# Patient Record
Sex: MALE | Race: WHITE | NOT HISPANIC OR LATINO | Employment: UNEMPLOYED | URBAN - METROPOLITAN AREA
[De-identification: names, ages, dates, MRNs, and addresses within clinical notes are randomized per-mention and may not be internally consistent; named-entity substitution may affect disease eponyms.]

---

## 2023-06-02 ENCOUNTER — HOSPITAL ENCOUNTER (EMERGENCY)
Facility: HOSPITAL | Age: 38
Discharge: HOME | End: 2023-06-02
Attending: EMERGENCY MEDICINE
Payer: MEDICARE

## 2023-06-02 VITALS
TEMPERATURE: 98 F | SYSTOLIC BLOOD PRESSURE: 125 MMHG | BODY MASS INDEX: 29.8 KG/M2 | RESPIRATION RATE: 20 BRPM | HEART RATE: 75 BPM | DIASTOLIC BLOOD PRESSURE: 74 MMHG | HEIGHT: 72 IN | OXYGEN SATURATION: 98 % | WEIGHT: 220 LBS

## 2023-06-02 DIAGNOSIS — R45.851 SUICIDAL IDEATIONS: ICD-10-CM

## 2023-06-02 DIAGNOSIS — F32.A DEPRESSION, UNSPECIFIED DEPRESSION TYPE: ICD-10-CM

## 2023-06-02 DIAGNOSIS — R45.851 SUICIDAL IDEATION: Primary | ICD-10-CM

## 2023-06-02 PROBLEM — F22 PARANOID DISORDER: Status: ACTIVE | Noted: 2023-06-02

## 2023-06-02 PROBLEM — F20.9 SCHIZOPHRENIA: Status: ACTIVE | Noted: 2023-06-02

## 2023-06-02 LAB
ALBUMIN SERPL-MCNC: 4.3 G/DL (ref 3.5–5)
ALBUMIN/GLOB SERPL: 1.4 RATIO (ref 1.1–2)
ALP SERPL-CCNC: 70 UNIT/L (ref 40–150)
ALT SERPL-CCNC: 18 UNIT/L (ref 0–55)
AMPHET UR QL SCN: NEGATIVE
APAP SERPL-MCNC: <17.4 UG/ML (ref 17.4–30)
APPEARANCE UR: CLEAR
AST SERPL-CCNC: 14 UNIT/L (ref 5–34)
BACTERIA #/AREA URNS AUTO: ABNORMAL /HPF
BARBITURATE SCN PRESENT UR: NEGATIVE
BASOPHILS # BLD AUTO: 0.02 X10(3)/MCL
BASOPHILS NFR BLD AUTO: 0.2 %
BENZODIAZ UR QL SCN: NEGATIVE
BILIRUB UR QL STRIP.AUTO: ABNORMAL MG/DL
BILIRUBIN DIRECT+TOT PNL SERPL-MCNC: 0.3 MG/DL
BUN SERPL-MCNC: 10.4 MG/DL (ref 8.9–20.6)
CALCIUM SERPL-MCNC: 9.3 MG/DL (ref 8.4–10.2)
CANNABINOIDS UR QL SCN: NEGATIVE
CHLORIDE SERPL-SCNC: 107 MMOL/L (ref 98–107)
CO2 SERPL-SCNC: 20 MMOL/L (ref 22–29)
COCAINE UR QL SCN: NEGATIVE
COLOR UR: YELLOW
CREAT SERPL-MCNC: 0.93 MG/DL (ref 0.73–1.18)
EOSINOPHIL # BLD AUTO: 0.08 X10(3)/MCL (ref 0–0.9)
EOSINOPHIL NFR BLD AUTO: 0.6 %
ERYTHROCYTE [DISTWIDTH] IN BLOOD BY AUTOMATED COUNT: 12.6 % (ref 11.5–17)
ETHANOL SERPL-MCNC: 10 MG/DL
FLUAV AG UPPER RESP QL IA.RAPID: NOT DETECTED
FLUBV AG UPPER RESP QL IA.RAPID: NOT DETECTED
GFR SERPLBLD CREATININE-BSD FMLA CKD-EPI: >60 MLS/MIN/1.73/M2
GLOBULIN SER-MCNC: 3.1 GM/DL (ref 2.4–3.5)
GLUCOSE SERPL-MCNC: 100 MG/DL (ref 74–100)
GLUCOSE UR QL STRIP.AUTO: NEGATIVE MG/DL
HCT VFR BLD AUTO: 46.2 % (ref 42–52)
HGB BLD-MCNC: 15.2 G/DL (ref 14–18)
HYALINE CASTS URNS QL MICRO: ABNORMAL /LPF
IMM GRANULOCYTES # BLD AUTO: 0.03 X10(3)/MCL (ref 0–0.04)
IMM GRANULOCYTES NFR BLD AUTO: 0.2 %
KETONES UR QL STRIP.AUTO: ABNORMAL MG/DL
LEUKOCYTE ESTERASE UR QL STRIP.AUTO: NEGATIVE UNIT/L
LYMPHOCYTES # BLD AUTO: 3.62 X10(3)/MCL (ref 0.6–4.6)
LYMPHOCYTES NFR BLD AUTO: 29.2 %
MCH RBC QN AUTO: 31.4 PG (ref 27–31)
MCHC RBC AUTO-ENTMCNC: 32.9 G/DL (ref 33–36)
MCV RBC AUTO: 95.5 FL (ref 80–94)
MONOCYTES # BLD AUTO: 0.89 X10(3)/MCL (ref 0.1–1.3)
MONOCYTES NFR BLD AUTO: 7.2 %
NEUTROPHILS # BLD AUTO: 7.74 X10(3)/MCL (ref 2.1–9.2)
NEUTROPHILS NFR BLD AUTO: 62.6 %
NITRITE UR QL STRIP.AUTO: NEGATIVE
OPIATES UR QL SCN: NEGATIVE
PCP UR QL: NEGATIVE
PH UR STRIP.AUTO: 5 [PH]
PH UR: 5 [PH] (ref 3–11)
PLATELET # BLD AUTO: 284 X10(3)/MCL (ref 130–400)
PMV BLD AUTO: 9.2 FL (ref 7.4–10.4)
POTASSIUM SERPL-SCNC: 3.7 MMOL/L (ref 3.5–5.1)
PROT SERPL-MCNC: 7.4 GM/DL (ref 6.4–8.3)
PROT UR QL STRIP.AUTO: NEGATIVE MG/DL
RBC # BLD AUTO: 4.84 X10(6)/MCL (ref 4.7–6.1)
RBC #/AREA URNS AUTO: ABNORMAL /HPF
RBC UR QL AUTO: NEGATIVE UNIT/L
SARS-COV-2 RNA RESP QL NAA+PROBE: NOT DETECTED
SODIUM SERPL-SCNC: 138 MMOL/L (ref 136–145)
SP GR UR STRIP.AUTO: >=1.03
SQUAMOUS #/AREA URNS AUTO: ABNORMAL /HPF
TSH SERPL-ACNC: 0.74 UIU/ML (ref 0.35–4.94)
UROBILINOGEN UR STRIP-ACNC: 0.2 MG/DL
WBC # SPEC AUTO: 12.38 X10(3)/MCL (ref 4.5–11.5)
WBC #/AREA URNS AUTO: ABNORMAL /HPF

## 2023-06-02 PROCEDURE — 84443 ASSAY THYROID STIM HORMONE: CPT | Performed by: EMERGENCY MEDICINE

## 2023-06-02 PROCEDURE — 85025 COMPLETE CBC W/AUTO DIFF WBC: CPT | Performed by: EMERGENCY MEDICINE

## 2023-06-02 PROCEDURE — 25000003 PHARM REV CODE 250: Performed by: EMERGENCY MEDICINE

## 2023-06-02 PROCEDURE — 81001 URINALYSIS AUTO W/SCOPE: CPT | Performed by: EMERGENCY MEDICINE

## 2023-06-02 PROCEDURE — 80143 DRUG ASSAY ACETAMINOPHEN: CPT | Performed by: EMERGENCY MEDICINE

## 2023-06-02 PROCEDURE — 82077 ASSAY SPEC XCP UR&BREATH IA: CPT | Performed by: EMERGENCY MEDICINE

## 2023-06-02 PROCEDURE — 99285 EMERGENCY DEPT VISIT HI MDM: CPT

## 2023-06-02 PROCEDURE — 80307 DRUG TEST PRSMV CHEM ANLYZR: CPT | Performed by: EMERGENCY MEDICINE

## 2023-06-02 PROCEDURE — 0240U COVID/FLU A&B PCR: CPT | Performed by: EMERGENCY MEDICINE

## 2023-06-02 PROCEDURE — 80053 COMPREHEN METABOLIC PANEL: CPT | Performed by: EMERGENCY MEDICINE

## 2023-06-02 RX ORDER — DIAZEPAM 5 MG/1
5 TABLET ORAL
Status: COMPLETED | OUTPATIENT
Start: 2023-06-02 | End: 2023-06-02

## 2023-06-02 RX ADMIN — DIAZEPAM 5 MG: 5 TABLET ORAL at 12:06

## 2023-06-02 NOTE — ED NOTES
Attempted to call report to [place], nurse not available for report at this time, will call back.

## 2023-06-02 NOTE — ED NOTES
Pt accepted at [facility] in [place] per Priti at intake for Dr Rodriguez --SPD contacted

## 2023-06-02 NOTE — ED PROVIDER NOTES
Encounter Date: 6/2/2023       History     Chief Complaint   Patient presents with    Suicidal     Thinking about hurting self. History of cutting self. Hearing voices, people fighting and killing each other, telling him to go see about it. States zombies outside.      This 38-year-old man presents with complaints of suicidal thoughts.  He has a history of schizophrenia and paranoia.  In 2021 he was on multiple psychiatric medications but they do not appear to have been refilled.  He complains of hearing voices and sounds of people fighting and killing each other.  He believes there zombies outside.  He periodically laughs out loud.     Review of patient's allergies indicates:  No Known Allergies  History reviewed. No pertinent past medical history.  History reviewed. No pertinent surgical history.  History reviewed. No pertinent family history.     Review of Systems   Constitutional:  Negative for fever.   HENT:  Negative for sore throat.    Respiratory:  Negative for shortness of breath.    Cardiovascular:  Negative for chest pain.   Gastrointestinal:  Negative for nausea.   Genitourinary:  Negative for dysuria.   Musculoskeletal:  Negative for back pain.   Skin:  Negative for rash.   Neurological:  Negative for weakness.   Hematological:  Does not bruise/bleed easily.   Psychiatric/Behavioral:  Positive for dysphoric mood, hallucinations and suicidal ideas.      Physical Exam     Initial Vitals [06/02/23 0930]   BP Pulse Resp Temp SpO2   129/85 96 18 98.2 °F (36.8 °C) 97 %      MAP       --         Physical Exam    Constitutional: He appears well-developed and well-nourished.   HENT:   Head: Normocephalic and atraumatic.   Mouth/Throat: Mucous membranes are normal.   Eyes: EOM are normal. Pupils are equal, round, and reactive to light.   Neck: Neck supple.   Normal range of motion.  Cardiovascular:  Normal rate, regular rhythm, normal heart sounds and intact distal pulses.           Pulmonary/Chest: Breath sounds  normal.   Abdominal: Abdomen is soft. Bowel sounds are normal.   Musculoskeletal:         General: Normal range of motion.      Cervical back: Normal range of motion and neck supple.     Neurological: He is alert and oriented to person, place, and time. He has normal strength.   Skin: Skin is warm and dry. Capillary refill takes less than 2 seconds.   Psychiatric: His behavior is normal. Judgment normal. His affect is inappropriate. He exhibits a depressed mood. He expresses suicidal ideation. He expresses no suicidal plans.       ED Course   Procedures  Labs Reviewed   COMPREHENSIVE METABOLIC PANEL - Abnormal; Notable for the following components:       Result Value    Carbon Dioxide 20 (*)     All other components within normal limits   URINALYSIS, REFLEX TO URINE CULTURE - Abnormal; Notable for the following components:    Ketones, UA Trace (*)     Bilirubin, UA Small (*)     All other components within normal limits   ACETAMINOPHEN LEVEL - Abnormal; Notable for the following components:    Acetaminophen Level <17.4 (*)     All other components within normal limits   CBC WITH DIFFERENTIAL - Abnormal; Notable for the following components:    WBC 12.38 (*)     MCV 95.5 (*)     MCH 31.4 (*)     MCHC 32.9 (*)     All other components within normal limits   URINALYSIS, MICROSCOPIC - Abnormal; Notable for the following components:    Hyaline Casts, UA Few (*)     Squamous Epithelial Cells, UA Few (*)     All other components within normal limits   TSH - Normal   DRUG SCREEN, URINE (BEAKER) - Normal    Narrative:     Cut off concentrations:    Amphetamines - 1000 ng/ml  Barbiturates - 200 ng/ml  Benzodiazepine - 200 ng/ml  Cannabinoids (THC) - 50 ng/ml  Cocaine - 300 ng/ml  Fentanyl - 1.0 ng/ml  MDMA - 500 ng/ml  Opiates - 300 ng/ml   Phencyclidine (PCP) - 25 ng/ml    Specimen submitted for drug analysis and tested for pH and specific gravity in order to evaluate sample integrity. Suspect tampering if specific gravity  is <1.003 and/or pH is not within the range of 4.5 - 8.0  False negatives may result form substances such as bleach added to urine.  False positives may result for the presence of a substance with similar chemical structure to the drug or its metabolite.    This test provides only a PRELIMINARY analytical test result. A more specific alternate chemical method must be used in order to obtain a confirmed analytical result. Gas chromatography/mass spectrometry (GC/MS) is the preferred confirmatory method. Other chemical confirmation methods are available. Clinical consideration and professional judgement should be applied to any drug of abuse test result, particularly when preliminary positive results are used.    Positive results will be confirmed only at the physicians request. Unconfirmed screening results are to be used only for medical purposes (treatment).        ALCOHOL,MEDICAL (ETHANOL) - Normal   COVID/FLU A&B PCR - Normal    Narrative:     The Xpert Xpress SARS-CoV-2/FLU/RSV plus is a rapid, multiplexed real-time PCR test intended for the simultaneous qualitative detection and differentiation of SARS-CoV-2, Influenza A, Influenza B, and respiratory syncytial virus (RSV) viral RNA in either nasopharyngeal swab or nasal swab specimens.         CBC W/ AUTO DIFFERENTIAL    Narrative:     The following orders were created for panel order CBC auto differential.  Procedure                               Abnormality         Status                     ---------                               -----------         ------                     CBC with Differential[[id number]]        Abnormal            Final result                 Please view results for these tests on the individual orders.          Imaging Results    None          Medications   diazePAM tablet 5 mg (5 mg Oral Given 6/2/23 1223)                    Medically cleared for psychiatry placement: 6/2/2023 11:13 AM         Clinical Impression:   Final  diagnoses:  [R45.851] Suicidal ideation (Primary)  [F32.A] Depression, unspecified depression type  [R45.851] Suicidal ideations        ED Disposition Condition    Transfer to Psych Facility Stable          ED Prescriptions    None       Follow-up Information    None          Alejandro Alvares MD  06/02/23 1113       Alejandro Alvares MD  06/02/23 2804

## 2023-06-02 NOTE — ED NOTES
Kimberly with [facility] called back and stated pt cannot go to [facility] in [place] and placement is changed [facility] for Dr Garcia --SPD notified

## 2023-06-02 NOTE — ED NOTES
[facility] called back and will not accept pt -[facility] called will return call spoke with Kimberly

## 2024-03-11 ENCOUNTER — HOSPITAL ENCOUNTER (EMERGENCY)
Facility: HOSPITAL | Age: 39
Discharge: PSYCHIATRIC OR SUBSTANCE USE FACILITY | End: 2024-03-12
Attending: EMERGENCY MEDICINE
Payer: MEDICARE

## 2024-03-11 DIAGNOSIS — F20.9 SCHIZOPHRENIA, UNSPECIFIED TYPE: ICD-10-CM

## 2024-03-11 DIAGNOSIS — Z00.8 MEDICAL CLEARANCE FOR PSYCHIATRIC ADMISSION: Primary | ICD-10-CM

## 2024-03-11 DIAGNOSIS — F29 PSYCHOSIS, UNSPECIFIED PSYCHOSIS TYPE: ICD-10-CM

## 2024-03-11 DIAGNOSIS — R45.850 HOMICIDAL IDEATION: ICD-10-CM

## 2024-03-11 LAB
ALBUMIN SERPL-MCNC: 4.2 G/DL (ref 3.5–5)
ALBUMIN/GLOB SERPL: 1.4 RATIO (ref 1.1–2)
ALP SERPL-CCNC: 65 UNIT/L (ref 40–150)
ALT SERPL-CCNC: 15 UNIT/L (ref 0–55)
AMPHET UR QL SCN: NEGATIVE
APAP SERPL-MCNC: <17.4 UG/ML (ref 17.4–30)
APPEARANCE UR: CLEAR
AST SERPL-CCNC: 15 UNIT/L (ref 5–34)
BACTERIA #/AREA URNS AUTO: ABNORMAL /HPF
BARBITURATE SCN PRESENT UR: NEGATIVE
BASOPHILS # BLD AUTO: 0.04 X10(3)/MCL
BASOPHILS NFR BLD AUTO: 0.3 %
BENZODIAZ UR QL SCN: NEGATIVE
BILIRUB SERPL-MCNC: 0.2 MG/DL
BILIRUB UR QL STRIP.AUTO: NEGATIVE
BUN SERPL-MCNC: 11.2 MG/DL (ref 8.9–20.6)
CALCIUM SERPL-MCNC: 10 MG/DL (ref 8.4–10.2)
CANNABINOIDS UR QL SCN: NEGATIVE
CHLORIDE SERPL-SCNC: 108 MMOL/L (ref 98–107)
CO2 SERPL-SCNC: 23 MMOL/L (ref 22–29)
COCAINE UR QL SCN: NEGATIVE
COLOR UR AUTO: YELLOW
CREAT SERPL-MCNC: 0.95 MG/DL (ref 0.73–1.18)
EOSINOPHIL # BLD AUTO: 0.09 X10(3)/MCL (ref 0–0.9)
EOSINOPHIL NFR BLD AUTO: 0.8 %
ERYTHROCYTE [DISTWIDTH] IN BLOOD BY AUTOMATED COUNT: 12.3 % (ref 11.5–17)
ETHANOL SERPL-MCNC: <10 MG/DL
FENTANYL UR QL SCN: NEGATIVE
GFR SERPLBLD CREATININE-BSD FMLA CKD-EPI: >60 MLS/MIN/1.73/M2
GLOBULIN SER-MCNC: 3.1 GM/DL (ref 2.4–3.5)
GLUCOSE SERPL-MCNC: 102 MG/DL (ref 74–100)
GLUCOSE UR QL STRIP.AUTO: NORMAL
HCT VFR BLD AUTO: 42.8 % (ref 42–52)
HGB BLD-MCNC: 14.8 G/DL (ref 14–18)
HYALINE CASTS #/AREA URNS LPF: ABNORMAL /LPF
IMM GRANULOCYTES # BLD AUTO: 0.04 X10(3)/MCL (ref 0–0.04)
IMM GRANULOCYTES NFR BLD AUTO: 0.3 %
KETONES UR QL STRIP.AUTO: ABNORMAL
LEUKOCYTE ESTERASE UR QL STRIP.AUTO: NEGATIVE
LYMPHOCYTES # BLD AUTO: 2.94 X10(3)/MCL (ref 0.6–4.6)
LYMPHOCYTES NFR BLD AUTO: 24.5 %
MCH RBC QN AUTO: 32.7 PG (ref 27–31)
MCHC RBC AUTO-ENTMCNC: 34.6 G/DL (ref 33–36)
MCV RBC AUTO: 94.5 FL (ref 80–94)
MDMA UR QL SCN: NEGATIVE
MONOCYTES # BLD AUTO: 0.61 X10(3)/MCL (ref 0.1–1.3)
MONOCYTES NFR BLD AUTO: 5.1 %
MUCOUS THREADS URNS QL MICRO: ABNORMAL /LPF
NEUTROPHILS # BLD AUTO: 8.27 X10(3)/MCL (ref 2.1–9.2)
NEUTROPHILS NFR BLD AUTO: 69 %
NITRITE UR QL STRIP.AUTO: NEGATIVE
NRBC BLD AUTO-RTO: 0 %
OPIATES UR QL SCN: NEGATIVE
PCP UR QL: NEGATIVE
PH UR STRIP.AUTO: 5.5 [PH]
PH UR: 5.5 [PH] (ref 3–11)
PLATELET # BLD AUTO: 257 X10(3)/MCL (ref 130–400)
PMV BLD AUTO: 9.9 FL (ref 7.4–10.4)
POTASSIUM SERPL-SCNC: 3.6 MMOL/L (ref 3.5–5.1)
PROT SERPL-MCNC: 7.3 GM/DL (ref 6.4–8.3)
PROT UR QL STRIP.AUTO: ABNORMAL
RBC # BLD AUTO: 4.53 X10(6)/MCL (ref 4.7–6.1)
RBC #/AREA URNS AUTO: ABNORMAL /HPF
RBC UR QL AUTO: NEGATIVE
SALICYLATES SERPL-MCNC: <5 MG/DL (ref 15–30)
SODIUM SERPL-SCNC: 142 MMOL/L (ref 136–145)
SP GR UR STRIP.AUTO: 1.04 (ref 1–1.03)
SPECIFIC GRAVITY, URINE AUTO (.000) (OHS): 1.04 (ref 1–1.03)
SQUAMOUS #/AREA URNS LPF: ABNORMAL /HPF
TSH SERPL-ACNC: 0.74 UIU/ML (ref 0.35–4.94)
UROBILINOGEN UR STRIP-ACNC: ABNORMAL
WBC # SPEC AUTO: 11.99 X10(3)/MCL (ref 4.5–11.5)
WBC #/AREA URNS AUTO: ABNORMAL /HPF

## 2024-03-11 PROCEDURE — 81001 URINALYSIS AUTO W/SCOPE: CPT | Mod: XB | Performed by: EMERGENCY MEDICINE

## 2024-03-11 PROCEDURE — 80053 COMPREHEN METABOLIC PANEL: CPT | Performed by: EMERGENCY MEDICINE

## 2024-03-11 PROCEDURE — 82077 ASSAY SPEC XCP UR&BREATH IA: CPT | Performed by: EMERGENCY MEDICINE

## 2024-03-11 PROCEDURE — 84443 ASSAY THYROID STIM HORMONE: CPT | Performed by: EMERGENCY MEDICINE

## 2024-03-11 PROCEDURE — 85025 COMPLETE CBC W/AUTO DIFF WBC: CPT | Performed by: EMERGENCY MEDICINE

## 2024-03-11 PROCEDURE — 80143 DRUG ASSAY ACETAMINOPHEN: CPT | Performed by: EMERGENCY MEDICINE

## 2024-03-11 PROCEDURE — 80307 DRUG TEST PRSMV CHEM ANLYZR: CPT | Performed by: EMERGENCY MEDICINE

## 2024-03-11 PROCEDURE — 99285 EMERGENCY DEPT VISIT HI MDM: CPT

## 2024-03-11 PROCEDURE — 80179 DRUG ASSAY SALICYLATE: CPT | Performed by: EMERGENCY MEDICINE

## 2024-03-12 VITALS
DIASTOLIC BLOOD PRESSURE: 82 MMHG | RESPIRATION RATE: 18 BRPM | HEART RATE: 98 BPM | TEMPERATURE: 99 F | OXYGEN SATURATION: 99 % | WEIGHT: 214.31 LBS | SYSTOLIC BLOOD PRESSURE: 128 MMHG | HEIGHT: 72 IN | BODY MASS INDEX: 29.03 KG/M2

## 2024-03-12 NOTE — ED PROVIDER NOTES
Encounter Date: 3/11/2024       History     Chief Complaint   Patient presents with    Psychiatric Evaluation     Patient reports getting into a verbal confrontation with his mother tonight, mother insisted that he come for eval. Patient denies SI, HI, or hallucinations. Hx of schizophrenia.     Referred to hospital by his mother although not under [facility].  History of schizophrenia, reports compliant with multiple medications, is able to list his medications.  Has been living with his mother, apparently she was in the process kicking him out of her house.  They got into an argument of some kind, he reports cursing at her, she sent him here to be evaluated.  No physical complaints.  He denies needing hospital level care.  Denies alcohol or drugs.    The history is provided by the patient. No  was used.     Review of patient's allergies indicates:  No Known Allergies  Past Medical History:   Diagnosis Date    Schizophrenia, unspecified      History reviewed. No pertinent surgical history.  History reviewed. No pertinent family history.  Social History     Tobacco Use    Smoking status: Every Day     Current packs/day: 1.00     Types: Cigarettes    Smokeless tobacco: Never     Review of Systems   Constitutional:  Negative for chills and fever.   HENT:  Negative for congestion, facial swelling, nosebleeds and sinus pressure.    Eyes:  Negative for pain and redness.   Respiratory:  Negative for chest tightness, shortness of breath and wheezing.    Cardiovascular:  Negative for chest pain, palpitations and leg swelling.   Gastrointestinal:  Negative for abdominal distention, abdominal pain, diarrhea, nausea and vomiting.   Endocrine: Negative for cold intolerance, polydipsia and polyphagia.   Genitourinary:  Negative for difficulty urinating, dysuria, frequency and hematuria.   Musculoskeletal:  Negative for arthralgias, back pain, myalgias and neck pain.   Skin:  Negative for color change and rash.    Neurological:  Negative for dizziness, weakness, numbness and headaches.   Hematological:  Negative for adenopathy. Does not bruise/bleed easily.   Psychiatric/Behavioral:  Positive for agitation and behavioral problems. The patient is nervous/anxious.    All other systems reviewed and are negative.      Physical Exam     Initial Vitals [03/11/24 2027]   BP Pulse Resp Temp SpO2   130/88 98 14 98.8 °F (37.1 °C) 98 %      MAP       --         Physical Exam    Nursing note and vitals reviewed.  Constitutional: He appears well-developed and well-nourished. He is not diaphoretic. No distress.   HENT:   Head: Normocephalic and atraumatic.   Mouth/Throat: Oropharynx is clear and moist. No oropharyngeal exudate.   Eyes: Conjunctivae and EOM are normal. Pupils are equal, round, and reactive to light. Right eye exhibits no discharge. Left eye exhibits no discharge. No scleral icterus.   Neck: Neck supple. No thyromegaly present. No tracheal deviation present. No JVD present.   Normal range of motion.  Cardiovascular:  Normal rate, regular rhythm and normal heart sounds.     Exam reveals no gallop and no friction rub.       No murmur heard.  Pulmonary/Chest: Breath sounds normal. No respiratory distress. He has no wheezes. He has no rhonchi. He has no rales. He exhibits no tenderness.   Abdominal: Abdomen is soft. Bowel sounds are normal. He exhibits no distension and no mass. There is no abdominal tenderness. There is no rebound and no guarding.   Musculoskeletal:         General: No tenderness or edema. Normal range of motion.      Cervical back: Normal range of motion and neck supple.     Lymphadenopathy:     He has no cervical adenopathy.   Neurological: He is alert and oriented to person, place, and time. He has normal strength. No cranial nerve deficit.   Skin: Skin is warm and dry. No rash noted. No erythema.   Psychiatric:   A&O x4.  Poor eye contact.  Flat affect.  Admits to argument with mother, cursing at her,  denies SI/ HI, delusion, hallucination, alcohol, drugs, physical complaints.  States compliant with meds and does not feel he needs to be hospitalized.  Memory appears grossly intact.  Poor insight and judgment with respect interaction with his mother.  Borderline grave disability.         ED Course   Procedures  Labs Reviewed   COMPREHENSIVE METABOLIC PANEL - Abnormal; Notable for the following components:       Result Value    Chloride 108 (*)     Glucose Level 102 (*)     All other components within normal limits   ACETAMINOPHEN LEVEL - Abnormal; Notable for the following components:    Acetaminophen Level <17.4 (*)     All other components within normal limits   SALICYLATE LEVEL - Abnormal; Notable for the following components:    Salicylate Level <5.0 (*)     All other components within normal limits   CBC WITH DIFFERENTIAL - Abnormal; Notable for the following components:    WBC 11.99 (*)     RBC 4.53 (*)     MCV 94.5 (*)     MCH 32.7 (*)     All other components within normal limits   TSH - Normal   ALCOHOL,MEDICAL (ETHANOL) - Normal   CBC W/ AUTO DIFFERENTIAL    Narrative:     The following orders were created for panel order CBC auto differential.  Procedure                               Abnormality         Status                     ---------                               -----------         ------                     CBC with Differential[[id number]]       Abnormal            Final result                 Please view results for these tests on the individual orders.   URINALYSIS, REFLEX TO URINE CULTURE   DRUG SCREEN, URINE (BEAKER)         10:06 PM Additional history is obtained from his mother, Kanwal Bob, who appears extremely reliable.  Has recently been inpatient for schizophrenia, has had medication changes, she is not sure if he is taking them.  He has become or aggressive and threatening, he has made homicidal threats towards her and appears to be actively hallucinating.  She is concerned  about the safety of her and her granddaughter.  Minimizing, denial, gravely disabled.  Proceed with [facility]. Medically cleared for inpatient Psych placement and transfer..         Imaging Results    None          Medications - No data to display  Medical Decision Making  Problems Addressed:  Homicidal ideation: acute illness or injury  Schizophrenia, unspecified type: chronic illness or injury with exacerbation, progression, or side effects of treatment    Amount and/or Complexity of Data Reviewed  Labs: ordered. Decision-making details documented in ED Course.    Risk  Decision regarding hospitalization.      Additional MDM:   Differential Diagnosis:   Exacerbation of psychiatric illness, substance abuse, medication noncompliance                Medically cleared for psychiatry placement: 3/11/2024 10:08 PM                   Clinical Impression:  Final diagnoses:  [Z00.8] Medical clearance for psychiatric admission (Primary)  [F29] Psychosis, unspecified psychosis type  [R45.850] Homicidal ideation  [F20.9] Schizophrenia, unspecified type          ED Disposition Condition    Transfer to Psych Facility Stable          ED Prescriptions    None       Follow-up Information    None          Shai Espinoza MD  03/11/24 5165

## 2024-06-03 ENCOUNTER — HOSPITAL ENCOUNTER (EMERGENCY)
Facility: HOSPITAL | Age: 39
Discharge: PSYCHIATRIC OR SUBSTANCE USE FACILITY | End: 2024-06-04
Attending: INTERNAL MEDICINE
Payer: MEDICARE

## 2024-06-03 DIAGNOSIS — Z00.8 MEDICAL CLEARANCE FOR PSYCHIATRIC ADMISSION: ICD-10-CM

## 2024-06-03 DIAGNOSIS — F20.9 SCHIZOPHRENIA, CHRONIC WITH ACUTE EXACERBATION: Primary | ICD-10-CM

## 2024-06-03 LAB
ALBUMIN SERPL-MCNC: 3.5 G/DL (ref 3.5–5)
ALBUMIN/GLOB SERPL: 1.1 RATIO (ref 1.1–2)
ALP SERPL-CCNC: 69 UNIT/L (ref 40–150)
ALT SERPL-CCNC: 36 UNIT/L (ref 0–55)
AMPHET UR QL SCN: NEGATIVE
ANION GAP SERPL CALC-SCNC: 11 MEQ/L
AST SERPL-CCNC: 27 UNIT/L (ref 5–34)
BACTERIA #/AREA URNS AUTO: ABNORMAL /HPF
BARBITURATE SCN PRESENT UR: NEGATIVE
BASOPHILS # BLD AUTO: 0.05 X10(3)/MCL
BASOPHILS NFR BLD AUTO: 0.4 %
BENZODIAZ UR QL SCN: NEGATIVE
BILIRUB SERPL-MCNC: 0.2 MG/DL
BILIRUB UR QL STRIP.AUTO: NEGATIVE
BUN SERPL-MCNC: 9.8 MG/DL (ref 8.9–20.6)
CALCIUM SERPL-MCNC: 9.4 MG/DL (ref 8.4–10.2)
CANNABINOIDS UR QL SCN: NEGATIVE
CHLORIDE SERPL-SCNC: 108 MMOL/L (ref 98–107)
CLARITY UR: CLEAR
CO2 SERPL-SCNC: 22 MMOL/L (ref 22–29)
COCAINE UR QL SCN: NEGATIVE
COLOR UR AUTO: YELLOW
CREAT SERPL-MCNC: 0.92 MG/DL (ref 0.73–1.18)
CREAT/UREA NIT SERPL: 11
EOSINOPHIL # BLD AUTO: 0.16 X10(3)/MCL (ref 0–0.9)
EOSINOPHIL NFR BLD AUTO: 1.3 %
ERYTHROCYTE [DISTWIDTH] IN BLOOD BY AUTOMATED COUNT: 12.7 % (ref 11.5–17)
ETHANOL SERPL-MCNC: <10 MG/DL
FENTANYL UR QL SCN: NEGATIVE
GFR SERPLBLD CREATININE-BSD FMLA CKD-EPI: >60 ML/MIN/1.73/M2
GLOBULIN SER-MCNC: 3.2 GM/DL (ref 2.4–3.5)
GLUCOSE SERPL-MCNC: 86 MG/DL (ref 74–100)
GLUCOSE UR QL STRIP: NORMAL
HCT VFR BLD AUTO: 42.5 % (ref 42–52)
HGB BLD-MCNC: 14.3 G/DL (ref 14–18)
HGB UR QL STRIP: NEGATIVE
HYALINE CASTS #/AREA URNS LPF: ABNORMAL /LPF
IMM GRANULOCYTES # BLD AUTO: 0.11 X10(3)/MCL (ref 0–0.04)
IMM GRANULOCYTES NFR BLD AUTO: 0.9 %
KETONES UR QL STRIP: NEGATIVE
LEUKOCYTE ESTERASE UR QL STRIP: NEGATIVE
LYMPHOCYTES # BLD AUTO: 3.41 X10(3)/MCL (ref 0.6–4.6)
LYMPHOCYTES NFR BLD AUTO: 27 %
MCH RBC QN AUTO: 32 PG (ref 27–31)
MCHC RBC AUTO-ENTMCNC: 33.6 G/DL (ref 33–36)
MCV RBC AUTO: 95.1 FL (ref 80–94)
MDMA UR QL SCN: NEGATIVE
MONOCYTES # BLD AUTO: 0.69 X10(3)/MCL (ref 0.1–1.3)
MONOCYTES NFR BLD AUTO: 5.5 %
MUCOUS THREADS URNS QL MICRO: ABNORMAL /LPF
NEUTROPHILS # BLD AUTO: 8.22 X10(3)/MCL (ref 2.1–9.2)
NEUTROPHILS NFR BLD AUTO: 64.9 %
NITRITE UR QL STRIP: NEGATIVE
NRBC BLD AUTO-RTO: 0 %
OPIATES UR QL SCN: NEGATIVE
PCP UR QL: NEGATIVE
PH UR STRIP: 5.5 [PH]
PH UR: 5.5 [PH] (ref 3–11)
PLATELET # BLD AUTO: 343 X10(3)/MCL (ref 130–400)
PMV BLD AUTO: 9.2 FL (ref 7.4–10.4)
POTASSIUM SERPL-SCNC: 3.8 MMOL/L (ref 3.5–5.1)
PROT SERPL-MCNC: 6.7 GM/DL (ref 6.4–8.3)
PROT UR QL STRIP: NEGATIVE
RBC # BLD AUTO: 4.47 X10(6)/MCL (ref 4.7–6.1)
RBC #/AREA URNS AUTO: ABNORMAL /HPF
SODIUM SERPL-SCNC: 141 MMOL/L (ref 136–145)
SP GR UR STRIP.AUTO: 1.02 (ref 1–1.03)
SPECIFIC GRAVITY, URINE AUTO (.000) (OHS): 1.02 (ref 1–1.03)
SQUAMOUS #/AREA URNS LPF: ABNORMAL /HPF
TSH SERPL-ACNC: 1.05 UIU/ML (ref 0.35–4.94)
UROBILINOGEN UR STRIP-ACNC: NORMAL
WBC # SPEC AUTO: 12.64 X10(3)/MCL (ref 4.5–11.5)
WBC #/AREA URNS AUTO: ABNORMAL /HPF

## 2024-06-03 PROCEDURE — 81001 URINALYSIS AUTO W/SCOPE: CPT | Performed by: INTERNAL MEDICINE

## 2024-06-03 PROCEDURE — 85025 COMPLETE CBC W/AUTO DIFF WBC: CPT | Performed by: INTERNAL MEDICINE

## 2024-06-03 PROCEDURE — 80053 COMPREHEN METABOLIC PANEL: CPT | Performed by: INTERNAL MEDICINE

## 2024-06-03 PROCEDURE — 84443 ASSAY THYROID STIM HORMONE: CPT | Performed by: INTERNAL MEDICINE

## 2024-06-03 PROCEDURE — 80307 DRUG TEST PRSMV CHEM ANLYZR: CPT | Performed by: INTERNAL MEDICINE

## 2024-06-03 PROCEDURE — 99285 EMERGENCY DEPT VISIT HI MDM: CPT

## 2024-06-03 PROCEDURE — 82077 ASSAY SPEC XCP UR&BREATH IA: CPT | Performed by: INTERNAL MEDICINE

## 2024-06-03 RX ORDER — IBUPROFEN 200 MG
1 TABLET ORAL DAILY
Status: DISCONTINUED | OUTPATIENT
Start: 2024-06-03 | End: 2024-06-04 | Stop reason: HOSPADM

## 2024-06-03 RX ORDER — RISPERIDONE 1 MG/1
2 TABLET ORAL
Status: DISCONTINUED | OUTPATIENT
Start: 2024-06-03 | End: 2024-06-04 | Stop reason: HOSPADM

## 2024-06-04 VITALS
WEIGHT: 205.69 LBS | HEIGHT: 72 IN | SYSTOLIC BLOOD PRESSURE: 129 MMHG | DIASTOLIC BLOOD PRESSURE: 73 MMHG | RESPIRATION RATE: 18 BRPM | HEART RATE: 71 BPM | OXYGEN SATURATION: 99 % | BODY MASS INDEX: 27.86 KG/M2 | TEMPERATURE: 98 F

## 2024-06-04 NOTE — ED PROVIDER NOTES
"Encounter Date: 6/3/2024       History     Chief Complaint   Patient presents with    Psychiatric Evaluation     Mother bringing patient in, reports he is having increased paranoia and auditory hallucinations. Reports speaking with her son and his replies, " don't make any sense". Pt denies SI/Hi. Mother reports she would think patient wasn't taking medications if she didn't witness him taking them. Mother reports patient will be fine one moment then exhibit drastic mood swings. Vss.     Brought by his mother due to non compliance with his prescribed medications and having threatening behavior at home. Mother states history of schizophrenia, multiple psychiatric admissions before but no changes in his medications or the opportunity to speak to the psychiatrist while inpatient.     The history is provided by the patient, medical records and a relative.     Review of patient's allergies indicates:  No Known Allergies  Past Medical History:   Diagnosis Date    Schizophrenia, unspecified      History reviewed. No pertinent surgical history.  No family history on file.  Social History     Tobacco Use    Smoking status: Every Day     Current packs/day: 1.00     Types: Cigarettes    Smokeless tobacco: Never     Review of Systems   Unable to perform ROS: Psychiatric disorder       Physical Exam     Initial Vitals [06/03/24 1903]   BP Pulse Resp Temp SpO2   124/78 86 20 98.1 °F (36.7 °C) 98 %      MAP       --         Physical Exam    Nursing note and vitals reviewed.  Constitutional: He appears well-developed and well-nourished. No distress.   HENT:   Head: Normocephalic and atraumatic.   Mouth/Throat: Oropharynx is clear and moist.   Eyes: Conjunctivae and EOM are normal. Pupils are equal, round, and reactive to light.   Neck: Neck supple. No thyromegaly present. No JVD present.   Normal range of motion.  Cardiovascular:  Normal rate, regular rhythm, normal heart sounds and intact distal pulses.           Pulmonary/Chest: " Breath sounds normal. No respiratory distress.   Abdominal: Abdomen is soft. Bowel sounds are normal. He exhibits no distension. There is no abdominal tenderness. There is no rebound and no guarding.   Musculoskeletal:         General: No edema. Normal range of motion.      Cervical back: Normal range of motion and neck supple.     Neurological: He is alert and oriented to person, place, and time. He has normal strength. GCS score is 15. GCS eye subscore is 4. GCS verbal subscore is 5. GCS motor subscore is 6.   Skin: Skin is warm and dry. No rash noted.   Psychiatric: His affect is blunt. His speech is delayed. He is slowed and withdrawn. He is not actively hallucinating. Thought content is delusional. Cognition and memory are impaired. He expresses inappropriate judgment. He is attentive.         ED Course   Procedures  Labs Reviewed   CBC W/ AUTO DIFFERENTIAL    Narrative:     The following orders were created for panel order CBC auto differential.  Procedure                               Abnormality         Status                     ---------                               -----------         ------                     CBC with Differential[[id number]]                                                        Please view results for these tests on the individual orders.   COMPREHENSIVE METABOLIC PANEL   TSH   URINALYSIS, REFLEX TO URINE CULTURE   DRUG SCREEN, URINE (BEAKER)   ALCOHOL,MEDICAL (ETHANOL)   SARS-COV-2 RNA AMPLIFICATION, QUAL   CBC WITH DIFFERENTIAL          Imaging Results    None          Medications   nicotine 14 mg/24 hr 1 patch (has no administration in time range)     Medical Decision Making  Amount and/or Complexity of Data Reviewed  Independent Historian: parent     Details: Mother states history of schizophrenia, multiple psychiatric admissions before but no changes in his medications or the opportunity to speak to the psychiatrist while inpatient.     Labs: ordered. Decision-making details  documented in ED Course.  ECG/medicine tests: ordered and independent interpretation performed. Decision-making details documented in ED Course.  Discussion of management or test interpretation with external provider(s): Case discussed with our psychiatric nurse for transfer. Information will be faxed to local psychiatric institutions for placement. MD will be available for report if needed after nurse report. Patient medically cleared and stable at this time for transfer process.         Additional MDM:   Differential Diagnosis:   Schizophrenia exacerbation, bipolar psychosis, drug induced psychosis, thyrotoxicosis, renal failure, liver failure, toxydrome, among others                  Medically cleared for psychiatry placement: 6/3/2024  7:47 PM                   Clinical Impression:  Final diagnoses:  [Z00.8] Medical clearance for psychiatric admission  [F20.9] Schizophrenia, chronic with acute exacerbation (Primary)          ED Disposition Condition    Transfer to Psych Facility [facility]          ED Prescriptions    None       Follow-up Information    None          Jay Jay Camp MD  06/03/24 1949

## 2024-08-20 ENCOUNTER — OFFICE VISIT (OUTPATIENT)
Dept: FAMILY MEDICINE | Facility: CLINIC | Age: 39
End: 2024-08-20
Payer: MEDICARE

## 2024-08-20 VITALS
BODY MASS INDEX: 26.82 KG/M2 | SYSTOLIC BLOOD PRESSURE: 101 MMHG | DIASTOLIC BLOOD PRESSURE: 70 MMHG | OXYGEN SATURATION: 97 % | RESPIRATION RATE: 18 BRPM | HEIGHT: 72 IN | TEMPERATURE: 98 F | WEIGHT: 198 LBS | HEART RATE: 75 BPM

## 2024-08-20 DIAGNOSIS — Z00.00 ENCOUNTER FOR MEDICAL EXAMINATION TO ESTABLISH CARE: ICD-10-CM

## 2024-08-20 DIAGNOSIS — Z00.00 ENCOUNTER FOR WELLNESS EXAMINATION: ICD-10-CM

## 2024-08-20 DIAGNOSIS — H66.002 NON-RECURRENT ACUTE SUPPURATIVE OTITIS MEDIA OF LEFT EAR WITHOUT SPONTANEOUS RUPTURE OF TYMPANIC MEMBRANE: Primary | ICD-10-CM

## 2024-08-20 DIAGNOSIS — F20.9 SCHIZOPHRENIA, UNSPECIFIED TYPE: ICD-10-CM

## 2024-08-20 DIAGNOSIS — F31.9 BIPOLAR AFFECTIVE DISORDER, REMISSION STATUS UNSPECIFIED: ICD-10-CM

## 2024-08-20 DIAGNOSIS — F22 PARANOID DISORDER: ICD-10-CM

## 2024-08-20 DIAGNOSIS — H61.22 IMPACTED CERUMEN, LEFT EAR: ICD-10-CM

## 2024-08-20 LAB
ALBUMIN SERPL-MCNC: 4.1 G/DL (ref 3.5–5)
ALBUMIN/GLOB SERPL: 1.4 RATIO (ref 1.1–2)
ALP SERPL-CCNC: 67 UNIT/L (ref 40–150)
ALT SERPL-CCNC: 10 UNIT/L (ref 0–55)
ANION GAP SERPL CALC-SCNC: 8 MEQ/L
AST SERPL-CCNC: 10 UNIT/L (ref 5–34)
BACTERIA #/AREA URNS AUTO: ABNORMAL /HPF
BASOPHILS # BLD AUTO: 0.05 X10(3)/MCL
BASOPHILS NFR BLD AUTO: 0.3 %
BILIRUB SERPL-MCNC: 0.2 MG/DL
BILIRUB UR QL STRIP.AUTO: NEGATIVE
BUN SERPL-MCNC: 16.5 MG/DL (ref 8.9–20.6)
CALCIUM SERPL-MCNC: 9.5 MG/DL (ref 8.4–10.2)
CHLORIDE SERPL-SCNC: 107 MMOL/L (ref 98–107)
CLARITY UR: CLEAR
CO2 SERPL-SCNC: 22 MMOL/L (ref 22–29)
COLOR UR AUTO: ABNORMAL
CREAT SERPL-MCNC: 0.96 MG/DL (ref 0.73–1.18)
CREAT/UREA NIT SERPL: 17
EOSINOPHIL # BLD AUTO: 0.18 X10(3)/MCL (ref 0–0.9)
EOSINOPHIL NFR BLD AUTO: 1.2 %
ERYTHROCYTE [DISTWIDTH] IN BLOOD BY AUTOMATED COUNT: 13.1 % (ref 11.5–17)
GFR SERPLBLD CREATININE-BSD FMLA CKD-EPI: >60 ML/MIN/1.73/M2
GLOBULIN SER-MCNC: 2.9 GM/DL (ref 2.4–3.5)
GLUCOSE SERPL-MCNC: 93 MG/DL (ref 74–100)
GLUCOSE UR QL STRIP: NORMAL
HCT VFR BLD AUTO: 44.4 % (ref 42–52)
HGB BLD-MCNC: 15.2 G/DL (ref 14–18)
HGB UR QL STRIP: NEGATIVE
HIV 1+2 AB+HIV1 P24 AG SERPL QL IA: NONREACTIVE
HYALINE CASTS #/AREA URNS LPF: ABNORMAL /LPF
IMM GRANULOCYTES # BLD AUTO: 0.21 X10(3)/MCL (ref 0–0.04)
IMM GRANULOCYTES NFR BLD AUTO: 1.4 %
KETONES UR QL STRIP: NEGATIVE
LEUKOCYTE ESTERASE UR QL STRIP: NEGATIVE
LYMPHOCYTES # BLD AUTO: 3.28 X10(3)/MCL (ref 0.6–4.6)
LYMPHOCYTES NFR BLD AUTO: 22.6 %
MCH RBC QN AUTO: 32.3 PG (ref 27–31)
MCHC RBC AUTO-ENTMCNC: 34.2 G/DL (ref 33–36)
MCV RBC AUTO: 94.3 FL (ref 80–94)
MONOCYTES # BLD AUTO: 0.8 X10(3)/MCL (ref 0.1–1.3)
MONOCYTES NFR BLD AUTO: 5.5 %
MUCOUS THREADS URNS QL MICRO: ABNORMAL /LPF
NEUTROPHILS # BLD AUTO: 10 X10(3)/MCL (ref 2.1–9.2)
NEUTROPHILS NFR BLD AUTO: 69 %
NITRITE UR QL STRIP: NEGATIVE
NRBC BLD AUTO-RTO: 0 %
PH UR STRIP: 5.5 [PH]
PLATELET # BLD AUTO: 252 X10(3)/MCL (ref 130–400)
PMV BLD AUTO: 10 FL (ref 7.4–10.4)
POTASSIUM SERPL-SCNC: 4 MMOL/L (ref 3.5–5.1)
PROT SERPL-MCNC: 7 GM/DL (ref 6.4–8.3)
PROT UR QL STRIP: NEGATIVE
RBC # BLD AUTO: 4.71 X10(6)/MCL (ref 4.7–6.1)
RBC #/AREA URNS AUTO: ABNORMAL /HPF
SODIUM SERPL-SCNC: 137 MMOL/L (ref 136–145)
SP GR UR STRIP.AUTO: 1.02 (ref 1–1.03)
SQUAMOUS #/AREA URNS LPF: ABNORMAL /HPF
T PALLIDUM AB SER QL: NONREACTIVE
UROBILINOGEN UR STRIP-ACNC: NORMAL
WBC # BLD AUTO: 14.52 X10(3)/MCL (ref 4.5–11.5)
WBC #/AREA URNS AUTO: ABNORMAL /HPF

## 2024-08-20 PROCEDURE — 36415 COLL VENOUS BLD VENIPUNCTURE: CPT | Performed by: NURSE PRACTITIONER

## 2024-08-20 PROCEDURE — 81001 URINALYSIS AUTO W/SCOPE: CPT | Performed by: NURSE PRACTITIONER

## 2024-08-20 PROCEDURE — 80053 COMPREHEN METABOLIC PANEL: CPT | Performed by: NURSE PRACTITIONER

## 2024-08-20 PROCEDURE — 85025 COMPLETE CBC W/AUTO DIFF WBC: CPT | Performed by: NURSE PRACTITIONER

## 2024-08-20 PROCEDURE — 99214 OFFICE O/P EST MOD 30 MIN: CPT | Mod: PBBFAC,PN | Performed by: NURSE PRACTITIONER

## 2024-08-20 PROCEDURE — 3078F DIAST BP <80 MM HG: CPT | Mod: CPTII,,, | Performed by: NURSE PRACTITIONER

## 2024-08-20 PROCEDURE — 86780 TREPONEMA PALLIDUM: CPT | Performed by: NURSE PRACTITIONER

## 2024-08-20 PROCEDURE — 87389 HIV-1 AG W/HIV-1&-2 AB AG IA: CPT | Performed by: NURSE PRACTITIONER

## 2024-08-20 PROCEDURE — 3074F SYST BP LT 130 MM HG: CPT | Mod: CPTII,,, | Performed by: NURSE PRACTITIONER

## 2024-08-20 PROCEDURE — 99214 OFFICE O/P EST MOD 30 MIN: CPT | Mod: 25,S$PBB,, | Performed by: NURSE PRACTITIONER

## 2024-08-20 PROCEDURE — 1159F MED LIST DOCD IN RCRD: CPT | Mod: CPTII,,, | Performed by: NURSE PRACTITIONER

## 2024-08-20 PROCEDURE — 99385 PREV VISIT NEW AGE 18-39: CPT | Mod: S$PBB,,, | Performed by: NURSE PRACTITIONER

## 2024-08-20 PROCEDURE — 3008F BODY MASS INDEX DOCD: CPT | Mod: CPTII,,, | Performed by: NURSE PRACTITIONER

## 2024-08-20 PROCEDURE — 1160F RVW MEDS BY RX/DR IN RCRD: CPT | Mod: CPTII,,, | Performed by: NURSE PRACTITIONER

## 2024-08-20 RX ORDER — OLANZAPINE 5 MG/1
5 TABLET ORAL NIGHTLY
COMMUNITY

## 2024-08-20 RX ORDER — LITHIUM CARBONATE 150 MG/1
150 CAPSULE ORAL 2 TIMES DAILY WITH MEALS
COMMUNITY

## 2024-08-20 RX ORDER — AMOXICILLIN 875 MG/1
875 TABLET, FILM COATED ORAL EVERY 12 HOURS
Qty: 20 TABLET | Refills: 0 | Status: SHIPPED | OUTPATIENT
Start: 2024-08-20 | End: 2024-08-30

## 2024-08-20 RX ORDER — BENZTROPINE MESYLATE 1 MG/1
0.5 TABLET ORAL 2 TIMES DAILY
COMMUNITY

## 2024-08-20 RX ORDER — HALOPERIDOL 0.5 MG/1
0.5 TABLET ORAL 4 TIMES DAILY
COMMUNITY

## 2024-08-20 RX ORDER — RISPERIDONE 2 MG/1
2 TABLET ORAL 2 TIMES DAILY
COMMUNITY

## 2024-08-20 NOTE — ASSESSMENT & PLAN NOTE
Instructed mother to obtain Debrox and instill into right ear until he returns in 2 weeks to recheck his left ear. It will be irrigated then    Pt instructed to stop qtip use.  We will send to Audiology if hearing still impaired after treating his ear infection and irrigating his ear

## 2024-08-20 NOTE — PROGRESS NOTES
Patient Name: Rogelio Hendricks     : 1985    MRN: [id number]     Subjective:     Patient ID: Rogelio Hendricks is a 39 y.o. male.    Chief Complaint:   Chief Complaint   Patient presents with    Establish Care     Pt is here to establish care with PCP. Pt has h/o hearing loss, schizophrenia and bipolar disorder.         HPI: 24:  Establish care with PCP. Pt with [employer]. Sees Dr. Edwards for mental health issues.  Not able to do all wellness labs today such as diabetic screening and thyroid testing or vitamin deficiencies, pt will need wellness visit in order to do all other labs. Mother is with him today and states that she would like to get him into a home. He has been to several and gets kicked out. She is having to manage his money and he does not wish to go into a home because he will lose his check.  She states if she gives him money, he will go and buy alcohol and spend all of the money. She said all he does is drink and smoke. She does not think his meds are working. She has second appointment with Dr. Edwards tomorrow at [facility]. The first visit was telehealth appointment.  She said they did labs during that appointment but she has the results at home.  He has issues with hearing. She said he will go through a box of qtips in no time. He is constantly sticking q tips in his ears.  He has impacted cerumen on right ear and left ear is infected with pus behind eardrum and redness.  He has to look at you to hear you speak.  He thinks he is diabetic because his fingers get white and pale he said.  Mother states that when he is on the correct medications, he is fine. He lives with her and her granddaughter and she says he runs the show there.    Denies any problems with abdominal pain, constipation, no urinary issues.          ROS:      Review of Systems   HENT:  Positive for hearing loss. Negative for ear discharge, ear pain and tinnitus.    Psychiatric/Behavioral:  Positive  for hallucinations. Negative for substance abuse and suicidal ideas.    All other systems reviewed and are negative.         History:     Past Medical History:   Diagnosis Date    Bipolar disorder     Schizophrenia     Schizophrenia, unspecified         History reviewed. No pertinent surgical history.    Family History   Problem Relation Name Age of Onset    No Known Problems Mother      No Known Problems Father      Throat cancer Maternal Uncle          Social History     Tobacco Use    Smoking status: Every Day     Current packs/day: 1.00     Types: Cigarettes, Vaping with nicotine    Smokeless tobacco: Never   Substance and Sexual Activity    Alcohol use: Not Currently    Drug use: Not Currently    Sexual activity: Not Currently       Current Outpatient Medications   Medication Instructions    amoxicillin (AMOXIL) 875 mg, Oral, Every 12 hours    benztropine (COGENTIN) 0.5 mg, Oral, 2 times daily    haloperidoL (HALDOL) 0.5 mg, Oral, 4 times daily    lithium carbonate 150 mg, Oral, 2 times daily with meals    OLANZapine (ZYPREXA) 5 mg, Oral, Nightly    risperiDONE (RISPERDAL) 2 mg, Oral, 2 times daily        Review of patient's allergies indicates:  No Known Allergies    Objective:     Visit Vitals  /70 (BP Location: Left arm, Patient Position: Sitting, BP Method: Large (Automatic))   Pulse 75   Temp 97.7 °F (36.5 °C) (Oral)   Resp 18   Ht 6' (1.829 m)   Wt 89.8 kg (198 lb)   SpO2 97%   BMI 26.85 kg/m²       Physical Examination:     Physical Exam  Vitals reviewed.   Constitutional:       Appearance: Normal appearance. He is normal weight.   HENT:      Head: Normocephalic.      Right Ear: No swelling or tenderness. There is impacted cerumen.      Left Ear: No swelling or tenderness. There is no impacted cerumen. Tympanic membrane is erythematous and bulging.   Cardiovascular:      Rate and Rhythm: Normal rate and regular rhythm.      Pulses: Normal pulses.      Heart sounds: Normal heart sounds.   Pulmonary:  "     Effort: Pulmonary effort is normal.      Breath sounds: Normal breath sounds.   Abdominal:      General: Abdomen is flat.      Palpations: Abdomen is soft.   Musculoskeletal:         General: Normal range of motion.      Cervical back: Normal range of motion.   Skin:     General: Skin is warm and dry.   Neurological:      Mental Status: He is alert.   Psychiatric:         Mood and Affect: Mood normal.         Lab Results:     Chemistry:  Lab Results   Component Value Date     06/03/2024    K 3.8 06/03/2024    BUN 9.8 06/03/2024    CREATININE 0.92 06/03/2024    EGFRNORACEVR >60 06/03/2024    GLUCOSE 86 06/03/2024    CALCIUM 9.4 06/03/2024    ALKPHOS 69 06/03/2024    LABPROT 6.7 06/03/2024    ALBUMIN 3.5 06/03/2024    BILIDIR 0.3 05/01/2021    IBILI 0.40 05/01/2021    AST 27 06/03/2024    ALT 36 06/03/2024    TSH 1.046 06/03/2024    BPKTLC7FMCJ 0.92 04/16/2018        No results found for: "HGBA1C", "MICROALBCREA"     Hematology:  Lab Results   Component Value Date    WBC 12.64 (H) 06/03/2024    HGB 14.3 06/03/2024    HCT 42.5 06/03/2024     06/03/2024       Lipid Panel:  Lab Results   Component Value Date    CHOL 159 01/12/2024    HDL 31 (L) 01/12/2024    LDL 91.00 01/12/2024    TRIG 185 (H) 01/12/2024    TOTALCHOLEST 5 01/12/2024        Urine:  Lab Results   Component Value Date    APPEARANCEUA Clear 06/03/2024    SGUA 1.022 06/03/2024    PROTEINUA Negative 06/03/2024    KETONESUA Negative 06/03/2024    LEUKOCYTESUR Negative 06/03/2024    RBCUA 0-5 06/03/2024    WBCUA 0-5 06/03/2024    BACTERIA None Seen 06/03/2024    SQEPUA None Seen 06/03/2024    HYALINECASTS None Seen 06/03/2024        Assessment:          ICD-10-CM ICD-9-CM   1. Non-recurrent acute suppurative otitis media of left ear without spontaneous rupture of tympanic membrane  H66.002 382.00   2. Encounter for wellness examination  Z00.00 V70.0   3. Paranoid disorder  F22 297.1   4. Schizophrenia, unspecified type  F20.9 295.90   5. " Bipolar affective disorder, remission status unspecified  F31.9 296.80   6. Encounter for medical examination to establish care  Z00.00 V70.9   7. Impacted cerumen, left ear  H61.22 380.4        Plan:     1. Non-recurrent acute suppurative otitis media of left ear without spontaneous rupture of tympanic membrane  Assessment & Plan:  Amoxil 875mg po BID x 10 days  RTC 2 weeks for recheck of ear    Orders:  -     amoxicillin (AMOXIL) 875 MG tablet; Take 1 tablet (875 mg total) by mouth every 12 (twelve) hours. for 10 days  Dispense: 20 tablet; Refill: 0    2. Encounter for wellness examination    3. Paranoid disorder  Assessment & Plan:  Keep all appt with [facility]    Orders:  -     CBC Auto Differential  -     Comprehensive Metabolic Panel  -     Urinalysis  -     HIV 1/2 Ag/Ab (4th Gen)  -     SYPHILIS ANTIBODY (WITH REFLEX RPR)  -     Hepatitis C antibody; Future; Expected date: 08/20/2024    4. Schizophrenia, unspecified type  Assessment & Plan:  Keep all appt with [facility]    Orders:  -     CBC Auto Differential  -     Comprehensive Metabolic Panel  -     Urinalysis  -     HIV 1/2 Ag/Ab (4th Gen)  -     SYPHILIS ANTIBODY (WITH REFLEX RPR)  -     Hepatitis C antibody; Future; Expected date: 08/20/2024    5. Bipolar affective disorder, remission status unspecified  Assessment & Plan:  Pt sees [facility], dr ayala for mental health issues.      6. Encounter for medical examination to establish care  Assessment & Plan:  CBC, CMP, Hep C, UA, HIV, Syphilis testing.  All other labs will have to be in [facility] annual wellness visit.      7. Impacted cerumen, left ear  Assessment & Plan:  Instructed mother to obtain Debrox and instill into right ear until he returns in 2 weeks to recheck his left ear. It will be irrigated then    Pt instructed to stop qtip use.  We will send to Audiology if hearing still impaired after treating his ear infection and irrigating his ear           Follow up in about 2 weeks (around 9/3/2024) for Review of labs;  recheeck ear infection.    Future Appointments   Date Time Provider Department Center   9/4/2024 12:00 PM Brenda Sinha FNP [facility]        [facility]

## 2024-09-19 ENCOUNTER — OFFICE VISIT (OUTPATIENT)
Dept: FAMILY MEDICINE | Facility: CLINIC | Age: 39
End: 2024-09-19
Payer: MEDICARE

## 2024-09-19 VITALS
BODY MASS INDEX: 27.63 KG/M2 | OXYGEN SATURATION: 98 % | DIASTOLIC BLOOD PRESSURE: 77 MMHG | SYSTOLIC BLOOD PRESSURE: 116 MMHG | TEMPERATURE: 98 F | HEIGHT: 72 IN | WEIGHT: 204 LBS | HEART RATE: 76 BPM

## 2024-09-19 DIAGNOSIS — H61.22 IMPACTED CERUMEN, LEFT EAR: Primary | ICD-10-CM

## 2024-09-19 DIAGNOSIS — F22 PARANOID DISORDER: ICD-10-CM

## 2024-09-19 DIAGNOSIS — F20.9 SCHIZOPHRENIA, UNSPECIFIED TYPE: ICD-10-CM

## 2024-09-19 DIAGNOSIS — D72.829 LEUKOCYTOSIS, UNSPECIFIED TYPE: ICD-10-CM

## 2024-09-19 DIAGNOSIS — F31.9 BIPOLAR AFFECTIVE DISORDER, REMISSION STATUS UNSPECIFIED: ICD-10-CM

## 2024-09-19 DIAGNOSIS — H66.002 NON-RECURRENT ACUTE SUPPURATIVE OTITIS MEDIA OF LEFT EAR WITHOUT SPONTANEOUS RUPTURE OF TYMPANIC MEMBRANE: ICD-10-CM

## 2024-09-19 PROBLEM — Z00.00 ENCOUNTER FOR MEDICAL EXAMINATION TO ESTABLISH CARE: Status: RESOLVED | Noted: 2024-08-20 | Resolved: 2024-09-19

## 2024-09-19 LAB
BASOPHILS # BLD AUTO: 0.06 X10(3)/MCL
BASOPHILS NFR BLD AUTO: 0.5 %
EOSINOPHIL # BLD AUTO: 0.26 X10(3)/MCL (ref 0–0.9)
EOSINOPHIL NFR BLD AUTO: 2.2 %
ERYTHROCYTE [DISTWIDTH] IN BLOOD BY AUTOMATED COUNT: 13.1 % (ref 11.5–17)
HCT VFR BLD AUTO: 43 % (ref 42–52)
HGB BLD-MCNC: 15.4 G/DL (ref 14–18)
IMM GRANULOCYTES # BLD AUTO: 0.04 X10(3)/MCL (ref 0–0.04)
IMM GRANULOCYTES NFR BLD AUTO: 0.3 %
LYMPHOCYTES # BLD AUTO: 4.1 X10(3)/MCL (ref 0.6–4.6)
LYMPHOCYTES NFR BLD AUTO: 34.4 %
MCH RBC QN AUTO: 33.5 PG (ref 27–31)
MCHC RBC AUTO-ENTMCNC: 35.8 G/DL (ref 33–36)
MCV RBC AUTO: 93.5 FL (ref 80–94)
MONOCYTES # BLD AUTO: 0.54 X10(3)/MCL (ref 0.1–1.3)
MONOCYTES NFR BLD AUTO: 4.5 %
NEUTROPHILS # BLD AUTO: 6.92 X10(3)/MCL (ref 2.1–9.2)
NEUTROPHILS NFR BLD AUTO: 58.1 %
NRBC BLD AUTO-RTO: 0 %
PLATELET # BLD AUTO: 248 X10(3)/MCL (ref 130–400)
PMV BLD AUTO: 10.2 FL (ref 7.4–10.4)
RBC # BLD AUTO: 4.6 X10(6)/MCL (ref 4.7–6.1)
WBC # BLD AUTO: 11.92 X10(3)/MCL (ref 4.5–11.5)

## 2024-09-19 PROCEDURE — 85025 COMPLETE CBC W/AUTO DIFF WBC: CPT | Performed by: NURSE PRACTITIONER

## 2024-09-19 PROCEDURE — 36415 COLL VENOUS BLD VENIPUNCTURE: CPT | Performed by: NURSE PRACTITIONER

## 2024-09-19 PROCEDURE — 99214 OFFICE O/P EST MOD 30 MIN: CPT | Mod: PBBFAC,PN | Performed by: NURSE PRACTITIONER

## 2024-09-19 RX ORDER — CEFDINIR 300 MG/1
300 CAPSULE ORAL 2 TIMES DAILY
Qty: 20 CAPSULE | Refills: 0 | Status: SHIPPED | OUTPATIENT
Start: 2024-09-19 | End: 2024-09-29

## 2024-09-19 NOTE — ASSESSMENT & PLAN NOTE
After Debrox for a week, mother returns with pt.  Right ear stil with impaction.  Irrigation attempted by nurse today and some evacuation was noted with irrigation but not complete.  Noticeable difference on re-exam noted but unable to fully irrigate his right ear.    Will refer to ENT for complete ear irrigation.     Pt instructed to stop qtip use.  We will send to Audiology if hearing still impaired after treating his ear infection and irrigating his ear

## 2024-09-19 NOTE — PROGRESS NOTES
Patient Name: Rogelio Hendricks     : 1985    MRN: [id number]     Subjective:     Patient ID: Rogelio Hendricks is a 39 y.o. male.    Chief Complaint:   Chief Complaint   Patient presents with    Follow-up     Patient here for follow up. Person with the patient states that he is here for follow up for his ear.patient states nothing is up with his ears. BP: 116/77        HPI: 24: FU for review of labs, recheck ear left ear. Pt dx with ear infection in left ear last visit which was his initial visit.  Mother present with him for visit today. She states that they did instill Debrox into his right ear which was impacted with cerumen last visit.    Mr Hendricks took all of his abx we gave him at his last appointment.  He does not complain of issues related to his ears but when prompted he did say his ear had been hurting.  Overally, his hearing seems to be somewhat improved but still not right.    Mentally, mother believes that his condition is deteriorating despite his medications. He has been seeing a psychiatrist at [facility], Dr. Edwards but it has only been once or twice that he has been seen.  Today, he is talking about random things bouncing from topic to topic.  He claims something in his bath water is making his skin cold.    Upon review of his labs with mother, his white count has been consistently elevated trending high for last 3 draws.  There is no obvious source of infection noted.  Urinalysis was negative at his first visit.  Pt is not able to fully articulate any sources of possible infection. Denies wounds or skin issues.  Denies urinary issues today.        24:  Establish care with PCP. Pt with [employer]. Sees Dr. Edwards for mental health issues.  Not able to do all wellness labs today such as diabetic screening and thyroid testing or vitamin deficiencies, pt will need wellness visit in order to do all other labs. Mother is with him today and states that she would  like to get him into a home. He has been to several and gets kicked out. She is having to manage his money and he does not wish to go into a home because he will lose his check.  She states if she gives him money, he will go and buy alcohol and spend all of the money. She said all he does is drink and smoke. She does not think his meds are working. She has second appointment with Dr. Edwards tomorrow at [facility]. The first visit was telehealth appointment.  She said they did labs during that appointment but she has the results at home.  He has issues with hearing. She said he will go through a box of qtips in no time. He is constantly sticking q tips in his ears.  He has impacted cerumen on right ear and left ear is infected with pus behind eardrum and redness.  He has to look at you to hear you speak.  He thinks he is diabetic because his fingers get white and pale he said.  Mother states that when he is on the correct medications, he is fine. He lives with her and her granddaughter and she says he runs the show there.    Denies any problems with abdominal pain, constipation, no urinary issues.              ROS:      Review of Systems   Constitutional: Negative.    HENT:  Positive for ear discharge, ear pain and hearing loss. Negative for congestion, sinus pain and sore throat.    Eyes: Negative.    Respiratory: Negative.  Negative for cough.    Cardiovascular: Negative.    Gastrointestinal: Negative.    Genitourinary: Negative.    Musculoskeletal: Negative.    Skin: Negative.    Neurological: Negative.    Endo/Heme/Allergies: Negative.    Psychiatric/Behavioral: Negative.     All other systems reviewed and are negative.           History:     Past Medical History:   Diagnosis Date    Bipolar disorder     Encounter for medical examination to establish care 08/20/2024    Schizophrenia     Schizophrenia, unspecified         History reviewed. No pertinent surgical history.    Family History   Problem Relation Name Age of  Onset    No Known Problems Mother      No Known Problems Father      Throat cancer Maternal Uncle          Social History     Tobacco Use    Smoking status: Every Day     Current packs/day: 1.00     Types: Cigarettes, Vaping with nicotine    Smokeless tobacco: Never   Substance and Sexual Activity    Alcohol use: Not Currently    Drug use: Not Currently    Sexual activity: Not Currently       Current Outpatient Medications   Medication Instructions    benztropine (COGENTIN) 0.5 mg, Oral, 2 times daily    cefdinir (OMNICEF) 300 mg, Oral, 2 times daily    haloperidoL (HALDOL) 0.5 mg, Oral, 4 times daily    lithium carbonate 150 mg, Oral, 2 times daily with meals    OLANZapine (ZYPREXA) 5 mg, Oral, Nightly    risperiDONE (RISPERDAL) 2 mg, Oral, 2 times daily        Review of patient's allergies indicates:  No Known Allergies    Objective:     Visit Vitals  /77 (BP Location: Right arm, Patient Position: Sitting)   Pulse 76   Temp 97.8 °F (36.6 °C) (Oral)   Ht 6' (1.829 m)   Wt 92.5 kg (204 lb)   SpO2 98%   BMI 27.67 kg/m²       Physical Examination:     Physical Exam  Vitals reviewed.   Constitutional:       Appearance: Normal appearance. He is normal weight.   HENT:      Head: Normocephalic.   Cardiovascular:      Rate and Rhythm: Normal rate and regular rhythm.      Pulses: Normal pulses.      Heart sounds: Normal heart sounds.   Pulmonary:      Effort: Pulmonary effort is normal.      Breath sounds: Normal breath sounds.   Abdominal:      General: Abdomen is flat.      Palpations: Abdomen is soft.   Musculoskeletal:         General: Normal range of motion.      Cervical back: Normal range of motion.   Skin:     General: Skin is warm and dry.   Neurological:      Mental Status: He is alert.   Psychiatric:         Attention and Perception: He is inattentive. He does not perceive auditory or visual hallucinations.         Mood and Affect: Affect is flat and inappropriate.         Speech: Speech is tangential.     "     Behavior: Behavior is slowed. Behavior is cooperative.         Thought Content: Thought content is paranoid and delusional. Thought content does not include homicidal or suicidal ideation. Thought content does not include homicidal or suicidal plan.         Cognition and Memory: Cognition is impaired.         Judgment: Judgment is inappropriate.         Lab Results:     Chemistry:  Lab Results   Component Value Date     08/20/2024    K 4.0 08/20/2024    BUN 16.5 08/20/2024    CREATININE 0.96 08/20/2024    EGFRNORACEVR >60 08/20/2024    GLUCOSE 93 08/20/2024    CALCIUM 9.5 08/20/2024    ALKPHOS 67 08/20/2024    LABPROT 7.0 08/20/2024    ALBUMIN 4.1 08/20/2024    BILIDIR 0.3 05/01/2021    IBILI 0.40 05/01/2021    AST 10 08/20/2024    ALT 10 08/20/2024    TSH 1.046 06/03/2024    [id number] 0.92 04/16/2018        No results found for: "HGBA1C", "MICROALBCREA"     Hematology:  Lab Results   Component Value Date    WBC 11.92 (H) 09/19/2024    HGB 15.4 09/19/2024    HCT 43.0 09/19/2024     09/19/2024       Lipid Panel:  Lab Results   Component Value Date    CHOL 159 01/12/2024    HDL 31 (L) 01/12/2024    LDL 91.00 01/12/2024    TRIG 185 (H) 01/12/2024    TOTALCHOLEST 5 01/12/2024        Urine:  Lab Results   Component Value Date    APPEARANCEUA Clear 08/20/2024    SGUA 1.024 08/20/2024    PROTEINUA Negative 08/20/2024    KETONESUA Negative 08/20/2024    LEUKOCYTESUR Negative 08/20/2024    RBCUA 0-5 08/20/2024    WBCUA 0-5 08/20/2024    BACTERIA None Seen 08/20/2024    SQEPUA Trace (A) 08/20/2024    HYALINECASTS None Seen 08/20/2024        Assessment:          ICD-10-CM ICD-9-CM   1. Impacted cerumen, left ear  H61.22 380.4   2. Non-recurrent acute suppurative otitis media of left ear without spontaneous rupture of tympanic membrane  H66.002 382.00   3. Schizophrenia, unspecified type  F20.9 295.90   4. Paranoid disorder  F22 297.1   5. Bipolar affective disorder, remission status unspecified  F31.9 " 296.80   6. Leukocytosis, unspecified type  D72.829 288.60        Plan:     1. Impacted cerumen, left ear  Assessment & Plan:  After Debrox for a week, mother returns with pt.  Right ear stil with impaction.  Irrigation attempted by nurse today and some evacuation was noted with irrigation but not complete.  Noticeable difference on re-exam noted but unable to fully irrigate his right ear.    Will refer to ENT for complete ear irrigation.     Pt instructed to stop qtip use.  We will send to Audiology if hearing still impaired after treating his ear infection and irrigating his ear      2. Non-recurrent acute suppurative otitis media of left ear without spontaneous rupture of tympanic membrane  Assessment & Plan:  Pt left ear is still infected. +pus drainage from TM. +erythema.   Cefdinir 300mg po BID x 10days   RTC for recheck of ear in 2 weeks.     Orders:  -     cefdinir (OMNICEF) 300 MG capsule; Take 1 capsule (300 mg total) by mouth 2 (two) times daily. for 10 days  Dispense: 20 capsule; Refill: 0    3. Schizophrenia, unspecified type  Assessment & Plan:  Mother requested referral to Dr. Esteves here.   referral placed.     Orders:  -     Ambulatory referral/consult to Behavioral Health; Future; Expected date: 09/26/2024  -     E-Consult to Adult Psychiatry    4. Paranoid disorder  -     Ambulatory referral/consult to Behavioral Health; Future; Expected date: 09/26/2024  -     E-Consult to Adult Psychiatry    5. Bipolar affective disorder, remission status unspecified  -     Ambulatory referral/consult to Behavioral Health; Future; Expected date: 09/26/2024  -     E-Consult to Adult Psychiatry    6. Leukocytosis, unspecified type  Assessment & Plan:  Pt with improved white count since last draw.  Considering Haldol as cause for decreased red blood cells and trending elevation in white count. E-consult to psychiatry to inquire about that but also, if Haldol and Lithium is a good combo for him as his mental health  seems to be getting worse as reported by mother.      Orders:  -     CBC Auto Differential         Follow up in about 2 weeks (around 10/3/2024) for recheck ear in 2 weeks on Friday.    Future Appointments   Date Time Provider Department Center   10/4/2024 11:00 AM Brenda Sinha FNP [facility]        [facility]

## 2024-09-19 NOTE — ASSESSMENT & PLAN NOTE
Pt left ear is still infected. +pus drainage from TM. +erythema.   Cefdinir 300mg po BID x 10days   RTC for recheck of ear in 2 weeks.

## 2024-09-20 ENCOUNTER — E-CONSULT (OUTPATIENT)
Dept: BEHAVIORAL HEALTH | Facility: CLINIC | Age: 39
End: 2024-09-20
Payer: MEDICARE

## 2024-09-20 DIAGNOSIS — F31.9 BIPOLAR AFFECTIVE DISORDER, REMISSION STATUS UNSPECIFIED: ICD-10-CM

## 2024-09-20 DIAGNOSIS — Z79.899 LITHIUM USE: Primary | ICD-10-CM

## 2024-09-20 DIAGNOSIS — F20.9 SCHIZOPHRENIA, UNSPECIFIED TYPE: Primary | ICD-10-CM

## 2024-09-20 NOTE — PROGRESS NOTES
I have sent medications and/or lab orders in for this patient.  Please notify the patient.     Orders Placed This Encounter   Procedures    Lithium Level           Standing Status:   Future     Standing Expiration Date:   10/20/2024

## 2024-09-20 NOTE — ASSESSMENT & PLAN NOTE
Pt with improved white count since last draw.  Considering Haldol as cause for decreased red blood cells and trending elevation in white count. E-consult to psychiatry to inquire about that but also, if Haldol and Lithium is a good combo for him as his mental health seems to be getting worse as reported by mother.

## 2024-09-20 NOTE — CONSULTS
"[facility]  Response for E-Consult     Patient Name: Rogelio Hendricks  MRN: [id number]  Primary Care Provider: Brenda Sinha FNP   Requesting Provider: Brenda Sinha FNP    E-Consult to Adult Psychiatry  Consult performed by: Duc Esteves MD  Consult ordered by: Brenda Sinha FNP      Per requesting provider:   "Patient has trending Leukocytosis that has improved since last draw, no obvious source of infection. Is Haldol causing this.  Also, should patient be taking Lithium with Haldol together?"    "Patient is being seen by [facility].  Mother requested referral to Dr. Esteves for better management as she feels his condition has deteriorated in last couple of weeks.    He has been referred to  today."    Recommendation:   Recent documentation and labwork in EMR reviewed  Pt currently on lithium 150mg bid, haldol 0.5mg qid but also has cogentin 0.5mg bid, risperidone 2mg bid and zyprexa 5mg nightly on MAR  Lithium level not available for review (recommend to obtain lithium level)  CBC w/ leukocytosis, anemia not present  Renal, heptic and thyroid function unremarkable  UDS on multiple occasions negative for all substances  Leukocytosis is an uncommon, but fairly well recognized, side effect of lithium but the clinical significant of this leukocytosis is unclear  Most antipsychotics (including haloperidol) are more commonly associated with leukopenia (especially clozapine) except in the setting of neuroleptic malignant syndrome (NMS)  Agree with plan to investigate possible infectious sources of leukocytosis, but if no such source is suspected, then lithium can be presumed to be the cause  Taking haloperidol and lithium together is fine as long as the patient is tolerating the medications well and monitoring for side effects is conducted appropriately (labs and AIMS checked at least once yearly)  Would recommend against concomitant use of >1 antipsychotic in most " situations, would recommend to consolidate regimen if able  Would recommend against maintenance use of benztropine due to negative effects on cognition unless pt demonstrates antipsychotic induced parkinsonism when not taking this medication and the antipsychotic in question is necessary for management of pt's psychiatric illness  Defer management of pt's psychotropic medication regimen to pt's psychiatrist  I am happy to meet with patient for evaluation and treatment if desired, referral to psychiatry placed 9/19/2024    Total time of Consultation: 25 minute    I did not speak to the requesting provider verbally about this.     *This eConsult is based on the clinical data available to me and is furnished without benefit of a physical examination. The eConsult will need to be interpreted in light of any clinical issues or changes in patient status not available to me at the time of filing this eConsults. Significant changes in patient condition or level of acuity should result in immediate formal consultation and reevaluation. Please alert me if you have further questions.    Thank you for this eConsult referral.     Duc Esteves MD  [facility]

## 2024-10-04 ENCOUNTER — OFFICE VISIT (OUTPATIENT)
Dept: FAMILY MEDICINE | Facility: CLINIC | Age: 39
End: 2024-10-04
Payer: MEDICARE

## 2024-10-04 VITALS
RESPIRATION RATE: 18 BRPM | HEART RATE: 78 BPM | BODY MASS INDEX: 27.5 KG/M2 | SYSTOLIC BLOOD PRESSURE: 105 MMHG | DIASTOLIC BLOOD PRESSURE: 67 MMHG | HEIGHT: 72 IN | TEMPERATURE: 98 F | WEIGHT: 203 LBS

## 2024-10-04 DIAGNOSIS — D72.829 LEUKOCYTOSIS, UNSPECIFIED TYPE: Primary | ICD-10-CM

## 2024-10-04 DIAGNOSIS — H61.21 IMPACTED CERUMEN OF RIGHT EAR: ICD-10-CM

## 2024-10-04 PROBLEM — H61.23 IMPACTED CERUMEN OF BOTH EARS: Status: ACTIVE | Noted: 2024-08-20

## 2024-10-04 PROCEDURE — 99214 OFFICE O/P EST MOD 30 MIN: CPT | Mod: PBBFAC,PN | Performed by: NURSE PRACTITIONER

## 2024-10-04 RX ORDER — HALOPERIDOL 10 MG/1
10 TABLET ORAL DAILY
COMMUNITY

## 2024-10-04 NOTE — ASSESSMENT & PLAN NOTE
After Debrox for a week, mother returns with pt.  Right ear still with impaction.  Irrigation attempted by nurse last visit and some evacuation was noted with irrigation but not complete.  Noticeable difference on re-exam noted but unable to fully irrigate his right ear.    Will refer to ENT for complete ear irrigation and also evaluation of left ear.     Pt instructed to stop qtip use.  We will send to Audiology if hearing still impaired after treating his ear infection and irrigating his ear

## 2024-10-04 NOTE — ASSESSMENT & PLAN NOTE
Believed to be 2/2 his lithium use.  Pt has upcoming appointment with Dr. Esteves to establish care.

## 2024-10-04 NOTE — PROGRESS NOTES
Patient Name: Rogelio Hendricks     : 1985    MRN: [id number]     Subjective:     Patient ID: Rogelio Hendricks is a 39 y.o. male.    Chief Complaint:   Chief Complaint   Patient presents with    Follow-up     Pt is here for follow up.        HPI: 10/4/24: Pt here for a recheck of his left ear infection.  Mother present for visit.        24: FU for review of labs, recheck ear left ear. Pt dx with ear infection in left ear last visit which was his initial visit.  Mother present with him for visit today. She states that they did instill Debrox into his right ear which was impacted with cerumen last visit.    Mr Hendricks took all of his abx we gave him at his last appointment.  He does not complain of issues related to his ears but when prompted he did say his ear had been hurting.  Overally, his hearing seems to be somewhat improved but still not right.    Mentally, mother believes that his condition is deteriorating despite his medications. He has been seeing a psychiatrist at [facility], Dr. Edwards but it has only been once or twice that he has been seen.  Today, he is talking about random things bouncing from topic to topic.  He claims something in his bath water is making his skin cold.    Upon review of his labs with mother, his white count has been consistently elevated trending high for last 3 draws.  There is no obvious source of infection noted.  Urinalysis was negative at his first visit.  Pt is not able to fully articulate any sources of possible infection. Denies wounds or skin issues.  Denies urinary issues today.        24:  Establish care with PCP. Pt with [employer]. Sees Dr. Edwards for mental health issues.  Not able to do all wellness labs today such as diabetic screening and thyroid testing or vitamin deficiencies, pt will need wellness visit in order to do all other labs. Mother is with him today and states that she would like to get him into a home. He has been  to several and gets kicked out. She is having to manage his money and he does not wish to go into a home because he will lose his check.  She states if she gives him money, he will go and buy alcohol and spend all of the money. She said all he does is drink and smoke. She does not think his meds are working. She has second appointment with Dr. Edwards tomorrow at [facility]. The first visit was telehealth appointment.  She said they did labs during that appointment but she has the results at home.  He has issues with hearing. She said he will go through a box of qtips in no time. He is constantly sticking q tips in his ears.  He has impacted cerumen on right ear and left ear is infected with pus behind eardrum and redness.  He has to look at you to hear you speak.  He thinks he is diabetic because his fingers get white and pale he said.  Mother states that when he is on the correct medications, he is fine. He lives with her and her granddaughter and she says he runs the show there.    Denies any problems with abdominal pain, constipation, no urinary issues.                ROS:      Review of Systems   HENT:  Positive for hearing loss. Negative for ear pain.             History:     Past Medical History:   Diagnosis Date    Bipolar disorder     Encounter for medical examination to establish care 08/20/2024    Schizophrenia     Schizophrenia, unspecified         History reviewed. No pertinent surgical history.    Family History   Problem Relation Name Age of Onset    No Known Problems Mother      No Known Problems Father      Throat cancer Maternal Uncle          Social History     Tobacco Use    Smoking status: Every Day     Current packs/day: 1.00     Types: Cigarettes, Vaping with nicotine    Smokeless tobacco: Never   Substance and Sexual Activity    Alcohol use: Not Currently    Drug use: Not Currently    Sexual activity: Not Currently       Current Outpatient Medications   Medication Instructions    benztropine  "(COGENTIN) 0.5 mg, 2 times daily    haloperidoL (HALDOL) 10 mg, Daily    lithium carbonate 150 mg, 2 times daily with meals    OLANZapine (ZYPREXA) 5 mg, Nightly    risperiDONE (RISPERDAL) 2 mg, 2 times daily        Review of patient's allergies indicates:  No Known Allergies    Objective:     Visit Vitals  /67 (BP Location: Left arm, Patient Position: Sitting)   Pulse 78   Temp 97.5 °F (36.4 °C) (Oral)   Resp 18   Ht 6' (1.829 m)   Wt 92.1 kg (203 lb)   BMI 27.53 kg/m²       Physical Examination:     Physical Exam  Vitals reviewed.   Constitutional:       Appearance: Normal appearance. He is normal weight.   HENT:      Head: Normocephalic.   Cardiovascular:      Rate and Rhythm: Normal rate and regular rhythm.      Pulses: Normal pulses.      Heart sounds: Normal heart sounds.   Pulmonary:      Effort: Pulmonary effort is normal.      Breath sounds: Normal breath sounds.   Abdominal:      General: Abdomen is flat.      Palpations: Abdomen is soft.   Musculoskeletal:         General: Normal range of motion.      Cervical back: Normal range of motion.   Skin:     General: Skin is warm and dry.   Neurological:      Mental Status: He is alert.   Psychiatric:         Mood and Affect: Mood normal.         Lab Results:     Chemistry:  Lab Results   Component Value Date     08/20/2024    K 4.0 08/20/2024    BUN 16.5 08/20/2024    CREATININE 0.96 08/20/2024    EGFRNORACEVR >60 08/20/2024    GLUCOSE 93 08/20/2024    CALCIUM 9.5 08/20/2024    ALKPHOS 67 08/20/2024    LABPROT 7.0 08/20/2024    ALBUMIN 4.1 08/20/2024    BILIDIR 0.3 05/01/2021    IBILI 0.40 05/01/2021    AST 10 08/20/2024    ALT 10 08/20/2024    TSH 1.046 06/03/2024    [id number] 0.92 04/16/2018        No results found for: "HGBA1C", "MICROALBCREA"     Hematology:  Lab Results   Component Value Date    WBC 11.92 (H) 09/19/2024    HGB 15.4 09/19/2024    HCT 43.0 09/19/2024     09/19/2024       Lipid Panel:  Lab Results   Component Value Date "    CHOL 159 01/12/2024    HDL 31 (L) 01/12/2024    LDL 91.00 01/12/2024    TRIG 185 (H) 01/12/2024    TOTALCHOLEST 5 01/12/2024        Urine:  Lab Results   Component Value Date    APPEARANCEUA Clear 08/20/2024    SGUA 1.024 08/20/2024    PROTEINUA Negative 08/20/2024    KETONESUA Negative 08/20/2024    LEUKOCYTESUR Negative 08/20/2024    RBCUA 0-5 08/20/2024    WBCUA 0-5 08/20/2024    BACTERIA None Seen 08/20/2024    SQEPUA Trace (A) 08/20/2024    HYALINECASTS None Seen 08/20/2024        Assessment:          ICD-10-CM ICD-9-CM   1. Leukocytosis, unspecified type  D72.829 288.60   2. Impacted cerumen of right ear  H61.21 380.4        Plan:     1. Leukocytosis, unspecified type  Assessment & Plan:  Believed to be 2/2 his lithium use.  Pt has upcoming appointment with Dr. Esteves to establish care.       2. Impacted cerumen of right ear  Assessment & Plan:  After Debrox for a week, mother returns with pt.  Right ear still with impaction.  Irrigation attempted by nurse last visit and some evacuation was noted with irrigation but not complete.  Noticeable difference on re-exam noted but unable to fully irrigate his right ear.    Will refer to ENT for complete ear irrigation and also evaluation of left ear.     Pt instructed to stop qtip use.  We will send to Audiology if hearing still impaired after treating his ear infection and irrigating his ear    Orders:  -     Ambulatory referral/consult to ENT; Future; Expected date: 10/11/2024         Follow up in about 6 months (around 4/4/2025) for Follow-up.    Future Appointments   Date Time Provider Department Center   11/18/2024 10:00 AM [facility] MD [facility]   4/4/2025 10:40 AM Brenda Sinha FNP [facility]        PETER Avina

## 2024-11-12 ENCOUNTER — TELEPHONE (OUTPATIENT)
Dept: BEHAVIORAL HEALTH | Facility: CLINIC | Age: 39
End: 2024-11-12
Payer: MEDICARE

## 2024-11-12 NOTE — TELEPHONE ENCOUNTER
----- Message from Jami sent at 11/12/2024  9:59 AM CST -----  .Type:  Patient Returning Call    Who Called:Kanwal  Who Left Message for Patient:mother  Does the patient know what this is regarding?:appt  Would the patient rather a call back or a response via [facility]?   Best Call Back Number:[phone]  Additional Information: Please call back to reschedule appt

## 2025-02-05 ENCOUNTER — OFFICE VISIT (OUTPATIENT)
Dept: FAMILY MEDICINE | Facility: CLINIC | Age: 40
End: 2025-02-05
Payer: MEDICARE

## 2025-02-05 VITALS
WEIGHT: 216 LBS | OXYGEN SATURATION: 98 % | DIASTOLIC BLOOD PRESSURE: 70 MMHG | HEIGHT: 72 IN | TEMPERATURE: 98 F | SYSTOLIC BLOOD PRESSURE: 105 MMHG | HEART RATE: 80 BPM | BODY MASS INDEX: 29.26 KG/M2 | RESPIRATION RATE: 18 BRPM

## 2025-02-05 DIAGNOSIS — F31.9 BIPOLAR AFFECTIVE DISORDER, REMISSION STATUS UNSPECIFIED: ICD-10-CM

## 2025-02-05 DIAGNOSIS — F22 PARANOID DISORDER: ICD-10-CM

## 2025-02-05 DIAGNOSIS — F20.9 SCHIZOPHRENIA, UNSPECIFIED TYPE: Primary | ICD-10-CM

## 2025-02-05 PROCEDURE — 1160F RVW MEDS BY RX/DR IN RCRD: CPT | Mod: CPTII,,, | Performed by: NURSE PRACTITIONER

## 2025-02-05 PROCEDURE — 99213 OFFICE O/P EST LOW 20 MIN: CPT | Mod: S$PBB,,, | Performed by: NURSE PRACTITIONER

## 2025-02-05 PROCEDURE — 3008F BODY MASS INDEX DOCD: CPT | Mod: CPTII,,, | Performed by: NURSE PRACTITIONER

## 2025-02-05 PROCEDURE — 3078F DIAST BP <80 MM HG: CPT | Mod: CPTII,,, | Performed by: NURSE PRACTITIONER

## 2025-02-05 PROCEDURE — 99214 OFFICE O/P EST MOD 30 MIN: CPT | Mod: PBBFAC,PN | Performed by: NURSE PRACTITIONER

## 2025-02-05 PROCEDURE — 1159F MED LIST DOCD IN RCRD: CPT | Mod: CPTII,,, | Performed by: NURSE PRACTITIONER

## 2025-02-05 PROCEDURE — 3074F SYST BP LT 130 MM HG: CPT | Mod: CPTII,,, | Performed by: NURSE PRACTITIONER

## 2025-02-05 NOTE — PROGRESS NOTES
Patient Name: Rogelio Hendricks     : 1985    MRN: [id number]     Subjective:     Patient ID: Rogelio Hendricks is a 39 y.o. male.    Chief Complaint:   Chief Complaint   Patient presents with    Follow-up     Pt needs paper work complete for nursing home admission        HPI: 25: Patient presents to clinic today with mother for forms to be completed for nursing home admission to [facility].  Patient states that psychiatrist at [facility] completed the mental health portion of the forms but the rest is needed for him to be admitted which have been scanned into the chart.  A physician is required to complete forms.  Patient has been having issues with arrests and not able to find housing or employment.  Mother has been trying to get him admitted.            ROS:      Review of Systems   Constitutional: Negative.    HENT: Negative.     Eyes: Negative.    Respiratory: Negative.     Cardiovascular: Negative.    Gastrointestinal: Negative.    Genitourinary: Negative.    Musculoskeletal: Negative.    Skin: Negative.    Neurological: Negative.    Endo/Heme/Allergies: Negative.    Psychiatric/Behavioral: Negative.     All other systems reviewed and are negative.        History:     Past Medical History:   Diagnosis Date    Bipolar disorder     Encounter for medical examination to establish care 2024    Schizophrenia     Schizophrenia, unspecified         History reviewed. No pertinent surgical history.    Family History   Problem Relation Name Age of Onset    No Known Problems Mother      No Known Problems Father      Throat cancer Maternal Uncle          Social History     Tobacco Use    Smoking status: Every Day     Current packs/day: 1.00     Types: Cigarettes, Vaping with nicotine    Smokeless tobacco: Never   Substance and Sexual Activity    Alcohol use: Not Currently    Drug use: Not Currently    Sexual activity: Not Currently       Current Outpatient Medications  "  Medication Instructions    benztropine (COGENTIN) 0.5 mg, 2 times daily    haloperidoL (HALDOL) 10 mg, Daily    lithium carbonate 150 mg, 2 times daily with meals    OLANZapine (ZYPREXA) 5 mg, Nightly    risperiDONE (RISPERDAL) 2 mg, 2 times daily        Review of patient's allergies indicates:  No Known Allergies    Objective:     Visit Vitals  /70 (BP Location: Left arm, Patient Position: Sitting)   Pulse 80   Temp 97.9 °F (36.6 °C) (Oral)   Resp 18   Ht 6' (1.829 m)   Wt 98 kg (216 lb)   SpO2 98%   BMI 29.29 kg/m²       Physical Examination:     Physical Exam  Vitals reviewed.   Constitutional:       Appearance: Normal appearance. He is normal weight.   HENT:      Head: Normocephalic.   Cardiovascular:      Rate and Rhythm: Normal rate and regular rhythm.      Pulses: Normal pulses.      Heart sounds: Normal heart sounds.   Pulmonary:      Effort: Pulmonary effort is normal.      Breath sounds: Normal breath sounds.   Abdominal:      General: Abdomen is flat.      Palpations: Abdomen is soft.   Musculoskeletal:         General: Normal range of motion.      Cervical back: Normal range of motion.   Skin:     General: Skin is warm and dry.   Neurological:      Mental Status: He is alert.   Psychiatric:         Mood and Affect: Mood normal.         Lab Results:     Chemistry:  Lab Results   Component Value Date     08/20/2024    K 4.0 08/20/2024    BUN 16.5 08/20/2024    CREATININE 0.96 08/20/2024    EGFRNORACEVR >60 08/20/2024    GLUCOSE 93 08/20/2024    CALCIUM 9.5 08/20/2024    ALKPHOS 67 08/20/2024    LABPROT 7.0 08/20/2024    ALBUMIN 4.1 08/20/2024    BILIDIR 0.3 05/01/2021    IBILI 0.40 05/01/2021    AST 10 08/20/2024    ALT 10 08/20/2024    TSH 1.046 06/03/2024    [id number] 0.92 04/16/2018        No results found for: "HGBA1C", "MICROALBCREA"     Hematology:  Lab Results   Component Value Date    WBC 11.92 (H) 09/19/2024    HGB 15.4 09/19/2024    HCT 43.0 09/19/2024     09/19/2024 "       Lipid Panel:  Lab Results   Component Value Date    CHOL 159 01/12/2024    HDL 31 (L) 01/12/2024    LDL 91.00 01/12/2024    TRIG 185 (H) 01/12/2024    TOTALCHOLEST 5 01/12/2024        Urine:  Lab Results   Component Value Date    APPEARANCEUA Clear 08/20/2024    SGUA 1.024 08/20/2024    PROTEINUA Negative 08/20/2024    KETONESUA Negative 08/20/2024    LEUKOCYTESUR Negative 08/20/2024    RBCUA 0-5 08/20/2024    WBCUA 0-5 08/20/2024    BACTERIA None Seen 08/20/2024    SQEPUA Trace (A) 08/20/2024    HYALINECASTS None Seen 08/20/2024        Assessment:          ICD-10-CM ICD-9-CM   1. Schizophrenia, unspecified type  F20.9 295.90   2. Paranoid disorder  F22 297.1   3. Bipolar affective disorder, remission status unspecified  F31.9 296.80          Plan:     1. Schizophrenia, unspecified type  -     Ambulatory referral/consult to Outpatient Case Management    2. Paranoid disorder  -     Ambulatory referral/consult to Outpatient Case Management    3. Bipolar affective disorder, remission status unspecified  -     Ambulatory referral/consult to Outpatient Case Management           Follow up if symptoms worsen or fail to improve..  In addition to their scheduled follow up, the patient has also been instructed to follow up on as needed basis.       Future Appointments   Date Time Provider Department Center   3/21/2025  9:00 AM Duc Esteves MD [facility]        [facility]

## 2025-02-05 NOTE — PATIENT INSTRUCTIONS
Darnell Mercado,     If you are due for any health screening(s) below please notify me so we can arrange them to be ordered and scheduled. Most healthy patients at your age complete them, but you are free to accept or refuse.     If you can't do it, I'll definitely understand. If you can, I'd certainly appreciate it!    All of your core healthy metrics are met.      Were here to help you quit smoking     Our records indicated that you are still smoking. One of the best things you can do for your health is to stop smoking and we are here to help.     Talk with your provider about our Smoking Cessation Program and how we can support you on your journey.

## 2025-02-25 ENCOUNTER — PATIENT OUTREACH (OUTPATIENT)
Dept: ADMINISTRATIVE | Facility: OTHER | Age: 40
End: 2025-02-25
Payer: MEDICARE

## 2025-02-26 ENCOUNTER — OUTPATIENT CASE MANAGEMENT (OUTPATIENT)
Dept: ADMINISTRATIVE | Facility: OTHER | Age: 40
End: 2025-02-26
Payer: MEDICARE

## 2025-03-05 ENCOUNTER — OUTPATIENT CASE MANAGEMENT (OUTPATIENT)
Dept: ADMINISTRATIVE | Facility: OTHER | Age: 40
End: 2025-03-05
Payer: MEDICARE

## 2025-03-05 NOTE — LETTER
Rogelio Hendricks  [address] [address]      Dear Rogelio Hendricks,     I am writing from the Outpatient Care Management Department at [facility]. I received a referral from PETER Galvez to contact you regarding any needs you may have. I was unable to reach you by phone. Please contact me for assistance.     I can be reached at [phone] Monday thru Friday from 8:00am to 4:30pm.      Additionally, [facility] also has a program with a nurse available 24/7 to answer questions or provide medical advice. [facility] can be reached at [phone].      Sincerely,        Francie Hurst LMSW  Outpatient Care Management

## 2025-03-10 NOTE — PROGRESS NOTES
SW to close case after 3 attempts to contact pt to complete social assessment. SW will notify PCP.

## 2025-04-07 ENCOUNTER — PATIENT OUTREACH (OUTPATIENT)
Dept: ADMINISTRATIVE | Facility: OTHER | Age: 40
End: 2025-04-07
Payer: MEDICARE

## 2025-04-07 NOTE — PROGRESS NOTES
CHW - Case Closure    This LPN spoke to caregiver via telephone today.   Pt/Caregiver reported: Pt's mother called about prior OPCM referral for NH placement for pt. I did let her know that LANIE Marmolejo tried to contact her several times. She left a message to Francie today. I told her I would let Francie know and she will contact pt's mother.   Pt/Caregiver denied any additional needs at this time and agrees with episode closure at this time.  Provided caregiver with Community Health Worker's contact information and encouraged him/her to contact this Community Health Worker if additional needs arise.

## 2025-08-13 ENCOUNTER — TELEPHONE (OUTPATIENT)
Dept: FAMILY MEDICINE | Facility: CLINIC | Age: 40
End: 2025-08-13
Payer: MEDICARE

## 2025-08-14 ENCOUNTER — TELEPHONE (OUTPATIENT)
Dept: FAMILY MEDICINE | Facility: CLINIC | Age: 40
End: 2025-08-14
Payer: MEDICARE